# Patient Record
Sex: FEMALE | Race: BLACK OR AFRICAN AMERICAN | Employment: OTHER | URBAN - METROPOLITAN AREA
[De-identification: names, ages, dates, MRNs, and addresses within clinical notes are randomized per-mention and may not be internally consistent; named-entity substitution may affect disease eponyms.]

---

## 2017-05-10 ENCOUNTER — OFFICE VISIT (OUTPATIENT)
Dept: SURGERY | Age: 67
End: 2017-05-10

## 2017-05-10 VITALS
OXYGEN SATURATION: 97 % | SYSTOLIC BLOOD PRESSURE: 128 MMHG | DIASTOLIC BLOOD PRESSURE: 61 MMHG | TEMPERATURE: 96.7 F | RESPIRATION RATE: 16 BRPM | HEART RATE: 80 BPM | WEIGHT: 179 LBS | BODY MASS INDEX: 31.71 KG/M2 | HEIGHT: 63 IN

## 2017-05-10 DIAGNOSIS — N95.1 SYMPTOMATIC MENOPAUSAL OR FEMALE CLIMACTERIC STATES: Primary | ICD-10-CM

## 2017-05-10 DIAGNOSIS — Z85.3 PERSONAL HISTORY OF BREAST CANCER: ICD-10-CM

## 2017-05-10 DIAGNOSIS — N95.2 POSTMENOPAUSAL ATROPHIC VAGINITIS: ICD-10-CM

## 2017-05-10 DIAGNOSIS — Z90.13 H/O BILATERAL MASTECTOMY: ICD-10-CM

## 2017-05-10 NOTE — PROGRESS NOTES
SUBJECTIVE: Gloria Jensen is a 75 y.o. female who presents with desire for annual well woman exam. Patient's last menstrual period was 2003. Allergies   Allergen Reactions    Aspirin Other (comments)     Aspirin makes me bleed\"    Sulfa (Sulfonamide Antibiotics) Nausea and Vomiting    Codeine Nausea and Vomiting    Erythromycin Itching    Pcn [Penicillins] Other (comments)     Throat closes. Past Medical History:   Diagnosis Date    [facility]    breast cancer, rt; DCIS    [facility] ([facility] 2011    left breast; DCIS    HX: breast cancer     Hypercholesterolemia     Hypertension        Past Surgical History:   Procedure Laterality Date    BIOPSY OF BREAST, INCISIONAL  2011    Left breast    BIOPSY OF BREAST, NEEDLE CORE  2011    Left breast    ENDOSCOPY, COLON, DIAGNOSTIC  2010    and polypectomy    HX BREAST BIOPSY      HX BREAST LUMPECTOMY  03    HX GYN      D & C    HX MASTECTOMY  2011    Double mastectomy     MYOMECTOMY 1-4,W/TOT 250GMS/<,ABD APPRCH      Laparoscopic myomectomy       OB History     Grav Para Term  Abortions TAB SAB Ect Mult Living    0 0 0 0 0 0 0 0 0 0          Family History   Problem Relation Age of Onset    Cancer Mother      stomach    Hypertension Mother     Cancer Father      stomach    Hypertension Father     Heart Disease Father     Diabetes Sister     Hypertension Sister     Diabetes Brother     Cancer Brother      prostate    Hypertension Brother     Diabetes Brother        Social History     Social History    Marital status: SINGLE     Spouse name: N/A    Number of children: N/A    Years of education: N/A     Occupational History    Not on file.      Social History Main Topics    Smoking status: Never Smoker    Smokeless tobacco: Never Used    Alcohol use No    Drug use: No    Sexual activity: No     Other Topics Concern    Not on file     Social History Narrative       Current Outpatient Prescriptions   Medication Sig Dispense Refill    atorvastatin (LIPITOR) 10 mg tablet Take  by mouth daily.  chlorhexidine (PERIDEX) 0.12 % solution 15 mL by Swish and Spit route two (2) times a day.  lisinopril (PRINIVIL, ZESTRIL) 40 mg tablet       hydrochlorothiazide (MICROZIDE) 12.5 mg capsule       potassium chloride (KAON 10%) 10 % solution            Review of Systems:   Constitutional: No weight change, chills or fever, anorexia, weakness or sleep disturbance . Cardiovascular: No chest pain, shortness of breath, or palpitations . Respiratory: No cough, shortness of breath, hemoptysis, or orthopnea . Neurologic: No syncope, headaches or seizures . Hematologic: No easy bruising or unusual bleeding . Psychiatric: No insomnia, confusion, depression, or anxiety . GI:No nausea and vomiting, diarrhea or constipation  . : See HPI . Musculoskeletal: No joint pain or muscle pain . Endocrine: No polydipsia, polyuria, cold intolerance, excessive fatigue, or sleep disturbance . Integumentary: No breast pain, lumps, nipple discharge, or axillary lumps . Objective:     Visit Vitals    /61    Pulse 80    Temp 96.7 °F (35.9 °C) (Oral)    Resp 16    Ht 5' 3\" (1.6 m)    Wt 179 lb (81.2 kg)    LMP 08/08/2003    SpO2 97%    BMI 31.71 kg/m2       General:  alert, cooperative, no distress, appears stated age   Skin:  no rash or abnormalities   Eyes: negative   Mouth: MMM no lesions   Lymph Nodes:  Cervical, supraclavicular, and axillary nodes normal.   Breast Exam: Bilateral mastectomy    Lungs:  clear to auscultation bilaterally   Heart:  regular rate and rhythm   Abdomen: soft, non-tender.  Bowel sounds normal. No masses,  no organomegaly   Back:  Costovertebral angle tenderness absent   Genitourinary: Pelvic exam: Pelvic exam: VULVA: normal appearing vulva with no masses, tenderness or lesions, VAGINA: normal appearing vagina with normal color and discharge, no lesions, CERVIX: normal appearing cervix without discharge or lesions, UTERUS: uterus is normal size, shape, consistency and nontender, ADNEXA: normal adnexa in size, nontender and no masses. Extremities:  extremities normal, atraumatic, no cyanosis or edema   Neurologic:  sensation grossly intact. Psychiatric:  non focal     ASSESSMENT:      ICD-10-CM ICD-9-CM    1. Symptomatic menopausal or female climacteric states N95.1 627.2    2. Postmenopausal atrophic vaginitis N95.2 627.3    3. Personal history of breast cancer Z85.3 V10.3    4. H/O bilateral mastectomy Z90.13 V45.71         Follow-up Disposition:  Return in about 1 year (around 5/10/2018), or if symptoms worsen or fail to improve.

## 2017-05-10 NOTE — MR AVS SNAPSHOT
Visit Information Date & Time Provider Department Dept. Phone Encounter #  
 5/10/2017  9:00 AM Riddhi Gardner, [address] [facility] Surgical [address] [id number] Follow-up Instructions Return in about 1 year (around 5/10/2018), or if symptoms worsen or fail to improve. Upcoming Health Maintenance Date Due Hepatitis C Screening 1950 DTaP/Tdap/Td series (1 - Tdap) 2/12/1970 FOBT Q 1 YEAR AGE 50-75 2/12/1999 ZOSTER VACCINE AGE 60> 2/12/2009 BREAST CANCER SCRN MAMMOGRAM 7/15/2013 GLAUCOMA SCREENING Q2Y 2/12/2014 OSTEOPOROSIS SCREENING (DEXA) 2/12/2014 Pneumococcal 65+ Low/Medium Risk (1 of 2 - PCV13) 2/12/2014 MEDICARE YEARLY EXAM 2/12/2014 INFLUENZA AGE 9 TO ADULT 8/1/2017 Allergies as of 5/10/2017  Review Complete On: 5/10/2017 By: Riddhi Gardner MD  
  
 Severity Noted Reaction Type Reactions Aspirin Medium 05/19/2011   Side Effect Other (comments) Aspirin makes me bleed\" Sulfa (Sulfonamide Antibiotics) Medium 07/15/2011   Systemic Nausea and Vomiting Codeine  05/02/2011    Nausea and Vomiting Erythromycin  05/02/2011    Itching Pcn [Penicillins]  05/02/2011    Other (comments) Throat closes. Current Immunizations  Never Reviewed No immunizations on file. Not reviewed this visit You Were Diagnosed With   
  
 Codes Comments Symptomatic menopausal or female climacteric states    -  Primary ICD-10-CM: N95.1 ICD-9-CM: 627.2 Postmenopausal atrophic vaginitis     ICD-10-CM: N95.2 ICD-9-CM: 627.3 Personal history of breast cancer     ICD-10-CM: Z85.3 ICD-9-CM: V10.3 H/O bilateral mastectomy     ICD-10-CM: Z90.13 ICD-9-CM: V45.71 Vitals BP Pulse Temp Resp Height(growth percentile) Weight(growth percentile) 128/61 80 96.7 °F (35.9 °C) (Oral) 16 5' 3\" (1.6 m) 179 lb (81.2 kg) LMP SpO2 BMI OB Status Smoking Status 08/08/2003 97% 31.71 kg/m2 Postmenopausal Never Smoker Vitals History BMI and BSA Data Body Mass Index Body Surface Area 31.71 kg/m 2 1.9 m 2 Preferred Pharmacy Pharmacy Name Phone [facility] [address] [place] [address] [phone] Your Updated Medication List  
  
   
This list is accurate as of: 5/10/17  9:22 AM.  Always use your most recent med list.  
  
  
  
  
 atorvastatin 10 mg tablet Commonly known as:  LIPITOR Take  by mouth daily. hydroCHLOROthiazide 12.5 mg capsule Commonly known as:  MICROZIDE  
  
 lisinopril 40 mg tablet Commonly known as:  PRINIVIL, ZESTRIL  
  
 PERIDEX 0.12 % solution Generic drug:  chlorhexidine 15 mL by Swish and Spit route two (2) times a day. potassium chloride 20 mEq/15 mL solution Commonly known as:  KAON 10% Follow-up Instructions Return in about 1 year (around 5/10/2018), or if symptoms worsen or fail to improve. Introducing [facility]! Dear Juliana Pedroza: 
Thank you for requesting a [employer] account. Our records indicate that you have previously registered for a [employer] account but its currently inactive. Please call our [employer] support line at [phone]. Additional Information If you have questions, please visit the Frequently Asked Questions section of the [employer] website at [email or web address] Remember, [employer] is NOT to be used for urgent needs. For medical emergencies, dial 911. Now available from your iPhone and Android! Please provide this summary of care documentation to your next provider. Your primary care clinician is listed as Roberto Portillo. If you have any questions after today's visit, please call [phone].

## 2018-05-11 ENCOUNTER — OFFICE VISIT (OUTPATIENT)
Dept: SURGERY | Age: 68
End: 2018-05-11

## 2018-05-11 ENCOUNTER — HOSPITAL ENCOUNTER (OUTPATIENT)
Dept: LAB | Age: 68
Discharge: HOME | End: 2018-05-11
Payer: MEDICARE

## 2018-05-11 VITALS
OXYGEN SATURATION: 100 % | BODY MASS INDEX: 31.71 KG/M2 | HEART RATE: 74 BPM | WEIGHT: 179 LBS | HEIGHT: 63 IN | TEMPERATURE: 96.9 F | DIASTOLIC BLOOD PRESSURE: 63 MMHG | SYSTOLIC BLOOD PRESSURE: 121 MMHG

## 2018-05-11 DIAGNOSIS — Z85.3 PERSONAL HISTORY OF BREAST CANCER: ICD-10-CM

## 2018-05-11 DIAGNOSIS — N95.2 POSTMENOPAUSAL ATROPHIC VAGINITIS: ICD-10-CM

## 2018-05-11 DIAGNOSIS — Z01.419 ENCOUNTER FOR ROUTINE GYNECOLOGICAL EXAMINATION WITH PAPANICOLAOU SMEAR OF CERVIX: Primary | ICD-10-CM

## 2018-05-11 DIAGNOSIS — Z90.13 H/O BILATERAL MASTECTOMY: ICD-10-CM

## 2018-05-11 DIAGNOSIS — N95.1 SYMPTOMATIC MENOPAUSAL OR FEMALE CLIMACTERIC STATES: ICD-10-CM

## 2018-05-11 PROCEDURE — 88142 CYTOPATH C/V THIN LAYER: CPT | Performed by: OBSTETRICS & GYNECOLOGY

## 2018-05-11 RX ORDER — TERBINAFINE HYDROCHLORIDE 250 MG/1
TABLET ORAL
COMMUNITY
Start: 2018-05-05

## 2018-05-11 NOTE — MR AVS SNAPSHOT
[address] [facility]. [facility] [address] [address] [phone] Patient: Libia Garcia MRN: [id number] QUN:[birth year] Visit Information Date & Time Provider Department Dept. Phone Encounter #  
 5/11/2018  8:45 AM Yazmin Barbosa, [address] [facility] Surgical [address] [id number] Follow-up Instructions Return in about 1 year (around 5/11/2019), or if symptoms worsen or fail to improve. Follow-up and Disposition History Upcoming Health Maintenance Date Due Hepatitis C Screening 1950 DTaP/Tdap/Td series (1 - Tdap) 2/12/1970 FOBT Q 1 YEAR AGE 50-75 2/12/1999 ZOSTER VACCINE AGE 60> 12/12/2008 BREAST CANCER SCRN MAMMOGRAM 7/15/2013 GLAUCOMA SCREENING Q2Y 2/12/2014 Bone Densitometry (Dexa) Screening 2/12/2014 Pneumococcal 65+ Low/Medium Risk (1 of 2 - PCV13) 2/12/2014 MEDICARE YEARLY EXAM 3/14/2018 Influenza Age 5 to Adult 8/1/2018 Allergies as of 5/11/2018  Review Complete On: 5/11/2018 By: Yazmin Barbosa MD  
  
 Severity Noted Reaction Type Reactions Aspirin Medium 05/19/2011   Side Effect Other (comments) Aspirin makes me bleed\" Sulfa (Sulfonamide Antibiotics) Medium 07/15/2011   Systemic Nausea and Vomiting Codeine  05/02/2011    Nausea and Vomiting Erythromycin  05/02/2011    Itching Pcn [Penicillins]  05/02/2011    Other (comments) Throat closes. Current Immunizations  Never Reviewed No immunizations on file. Not reviewed this visit You Were Diagnosed With   
  
 Codes Comments Encounter for routine gynecological examination with Papanicolaou smear of cervix    -  Primary ICD-10-CM: J67.000 ICD-9-CM: V72.31, V76.2 Symptomatic menopausal or female climacteric states     ICD-10-CM: N95.1 ICD-9-CM: 627.2 H/O bilateral mastectomy     ICD-10-CM: Z90.13 ICD-9-CM: V45.71 Personal history of breast cancer     ICD-10-CM: Z85.3 ICD-9-CM: V10.3 Postmenopausal atrophic vaginitis     ICD-10-CM: N95.2 ICD-9-CM: 627.3 Vitals BP Pulse Temp Height(growth percentile) Weight(growth percentile) LMP  
 121/63 74 96.9 °F (36.1 °C) (Temporal) 5' 3\" (1.6 m) 179 lb (81.2 kg) 08/08/2003 SpO2 BMI OB Status Smoking Status 100% 31.71 kg/m2 Postmenopausal Never Smoker Vitals History BMI and BSA Data Body Mass Index Body Surface Area 31.71 kg/m 2 1.9 m 2 Preferred Pharmacy Pharmacy Name Phone [facility]  [address] [phone] Your Updated Medication List  
  
   
This list is accurate as of 5/11/18  9:56 AM.  Always use your most recent med list.  
  
  
  
  
 atorvastatin 10 mg tablet Commonly known as:  LIPITOR Take  by mouth daily. hydroCHLOROthiazide 12.5 mg capsule Commonly known as:  MICROZIDE  
  
 lisinopril 40 mg tablet Commonly known as:  PRINIVIL, ZESTRIL  
  
 PERIDEX 0.12 % solution Generic drug:  chlorhexidine 15 mL by Swish and Spit route two (2) times a day. terbinafine HCl 250 mg tablet Commonly known as:  LAMISIL We Performed the Following OBTAINING SCREEN PAP SMEAR [ [facility]] PAP, LB, RFX HPV [id number]) [id number] CPT(R)] Follow-up Instructions Return in about 1 year (around 5/11/2019), or if symptoms worsen or fail to improve. Introducing [facility]! Princess Flores introduces [employer] patient portal. Now you can access parts of your medical record, email your doctor's office, and request medication refills online. 1. In your internet browser, go to [email or web address] 2. Click on the First Time User? Click Here link in the Sign In box. You will see the New Member Sign Up page. 3. Enter your [employer] Access Code exactly as it appears below. You will not need to use this code after youve completed the sign-up process.  If you do not sign up before the expiration date, you must request a new code. · [employer] Access Code: [email or web address] Expires: 8/9/2018  8:48 AM 
 
4. Enter the last four digits of your Social Security Number (xxxx) and Date of Birth (mm/dd/yyyy) as indicated and click Submit. You will be taken to the next sign-up page. 5. Create a [employer] ID. This will be your [employer] login ID and cannot be changed, so think of one that is secure and easy to remember. 6. Create a [employer] password. You can change your password at any time. 7. Enter your Password Reset Question and Answer. This can be used at a later time if you forget your password. 8. Enter your e-mail address. You will receive e-mail notification when new information is available in [address] 9. Click Sign Up. You can now view and download portions of your medical record. 10. Click the Download Summary menu link to download a portable copy of your medical information. If you have questions, please visit the Frequently Asked Questions section of the [employer] website. Remember, [employer] is NOT to be used for urgent needs. For medical emergencies, dial 911. Now available from your iPhone and Android! Please provide this summary of care documentation to your next provider. Your primary care clinician is listed as Park Bower. If you have any questions after today's visit, please call [phone].

## 2018-05-11 NOTE — PROGRESS NOTES
SUBJECTIVE: Melanie Richards is a 70 y.o. female who presents with desire for annual well woman exam. Patient's last menstrual period was 2003. Allergies   Allergen Reactions    Aspirin Other (comments)     Aspirin makes me bleed\"    Sulfa (Sulfonamide Antibiotics) Nausea and Vomiting    Codeine Nausea and Vomiting    Erythromycin Itching    Pcn [Penicillins] Other (comments)     Throat closes. Past Medical History:   Diagnosis Date    [facility]    breast cancer, rt; DCIS    [facility] ([facility] 2011    left breast; DCIS    HX: breast cancer     Hypercholesterolemia     Hypertension        Past Surgical History:   Procedure Laterality Date    BIOPSY OF BREAST, INCISIONAL  2011    Left breast    ENDOSCOPY, COLON, DIAGNOSTIC  2010    and polypectomy    HX BREAST BIOPSY      HX BREAST LUMPECTOMY  03    HX GYN      D & C    HX MASTECTOMY  2011    Double mastectomy     MYOMECTOMY 1-4,W/TOT 250GMS/<,ABD APPRCH      Laparoscopic myomectomy    MO BIOPSY OF BREAST, NEEDLE CORE  2011    Left breast       OB History     Grav Para Term  Abortions TAB SAB Ect Mult Living    0 0 0 0 0 0 0 0 0 0          Family History   Problem Relation Age of Onset    Cancer Mother      stomach    Hypertension Mother     Cancer Father      stomach    Hypertension Father     Heart Disease Father     Diabetes Sister     Hypertension Sister     Diabetes Brother     Cancer Brother      prostate    Hypertension Brother     Diabetes Brother        Social History     Social History    Marital status: SINGLE     Spouse name: N/A    Number of children: N/A    Years of education: N/A     Occupational History    Not on file.      Social History Main Topics    Smoking status: Never Smoker    Smokeless tobacco: Never Used    Alcohol use No    Drug use: No    Sexual activity: No     Other Topics Concern    Not on file     Social History Narrative       Current Outpatient Prescriptions   Medication Sig Dispense Refill    terbinafine HCl (LAMISIL) 250 mg tablet       atorvastatin (LIPITOR) 10 mg tablet Take  by mouth daily.  chlorhexidine (PERIDEX) 0.12 % solution 15 mL by Swish and Spit route two (2) times a day.  lisinopril (PRINIVIL, ZESTRIL) 40 mg tablet       hydrochlorothiazide (MICROZIDE) 12.5 mg capsule            Review of Systems:   Constitutional: No weight change, chills or fever, anorexia, weakness or sleep disturbance . Cardiovascular: No chest pain, shortness of breath, or palpitations . Respiratory: No cough, shortness of breath, hemoptysis, or orthopnea . Neurologic: No syncope, headaches or seizures . Hematologic: No easy bruising or unusual bleeding . Psychiatric: No insomnia, confusion, depression, or anxiety . GI:No nausea and vomiting, diarrhea or constipation  . : See HPI . Musculoskeletal: No joint pain or muscle pain . Endocrine: No polydipsia, polyuria, cold intolerance, excessive fatigue, or sleep disturbance . Integumentary: No breast pain, lumps, nipple discharge, or axillary lumps . Objective:     Visit Vitals    /63    Pulse 74    Temp 96.9 °F (36.1 °C) (Temporal)    Ht 5' 3\" (1.6 m)    Wt 179 lb (81.2 kg)    LMP 08/08/2003    SpO2 100%    BMI 31.71 kg/m2       General:  alert, cooperative, no distress, appears stated age   Skin:  no rash or abnormalities   Eyes: negative   Mouth: MMM no lesions   Lymph Nodes:  Cervical, supraclavicular, and axillary nodes normal.   Breast Exam: Bilateral mastectomy    Lungs:  clear to auscultation bilaterally   Heart:  regular rate and rhythm   Abdomen: soft, non-tender.  Bowel sounds normal. No masses,  no organomegaly   Back:  Costovertebral angle tenderness absent   Genitourinary: Pelvic exam: Pelvic exam: VULVA: normal appearing vulva with no masses, tenderness or lesions, VAGINA: normal appearing vagina with normal color and discharge, no lesions, CERVIX: normal appearing cervix without discharge or lesions, UTERUS: uterus is normal size, shape, consistency and nontender, ADNEXA: normal adnexa in size, nontender and no masses. Extremities:  extremities normal, atraumatic, no cyanosis or edema   Neurologic:  sensation grossly intact. Psychiatric:  non focal     ASSESSMENT:      ICD-10-CM ICD-9-CM    1. Encounter for routine gynecological examination with Papanicolaou smear of cervix Z01.419 V72.31 PAP, LB, RFX HPV [id number])     V76.2 OBTAINING SCREEN PAP SMEAR   2. Symptomatic menopausal or female climacteric states N95.1 627.2    3. H/O bilateral mastectomy Z90.13 V45.71    4. Personal history of breast cancer Z85.3 V10.3    5. Postmenopausal atrophic vaginitis N95.2 627.3         Follow-up Disposition:  Return in about 1 year (around 5/11/2019), or if symptoms worsen or fail to improve.